# Patient Record
(demographics unavailable — no encounter records)

---

## 2024-11-04 NOTE — ASSESSMENT
[Levothyroxine] : The patient was instructed to take Levothyroxine on an empty stomach, separate from vitamins, and wait at least 30 minutes before eating [FreeTextEntry1] : 27 yo M with PMH of primary hypogonadism and papillary thyroid carcinoma  #Primary Hypogonadism:  -  testosterone cypionate 0.6 mL weekly, injects on mondays (due today) - trough -Testosterone replacement was intermittently stopped when the patient had an elbow fracture. Patient reinitiated therapy.   - check testosterone, CBC, CMP  -DEXA 4/2023: repeat in 2 years RESULTS: Spine: -0.6, normal. Femoral neck: 0.0 , normal. Total hip: 0.9 , normal.  #Papillary thyroid CA: s/p R lobectomy 8/6/18. Tumor size <1cm, w/no LN involvement. Stage [id number]. Thyroid US done 10/19 showed no evidence of recurrent disease. No nodules or suspicious LN.  - SOFY low risk, goal TSH 0.5-2 -Continue levothyroxine 112 mcg daily pending labs. +Tg given hx of only lobectomy - 10/2022: neck US wnl - repeat neck US ordered today.  #Hypothyroidism - continue LT4 112 mcg as above, check labs today  RTC 6 months

## 2024-11-04 NOTE — PHYSICAL EXAM
[Alert] : alert [Well Nourished] : well nourished [No Acute Distress] : no acute distress [No Neck Mass] : no neck mass was observed [Thyroid Not Enlarged] : the thyroid was not enlarged [No Respiratory Distress] : no respiratory distress [No Accessory Muscle Use] : no accessory muscle use [Clear to Auscultation] : lungs were clear to auscultation bilaterally [Normal PMI] : the apical impulse was normal [Normal S1, S2] : normal S1 and S2 [Normal Rate] : heart rate was normal [Oriented x3] : oriented to person, place, and time [Normal Affect] : the affect was normal [Normal Insight/Judgement] : insight and judgment were intact [Normal Mood] : the mood was normal

## 2024-11-04 NOTE — HISTORY OF PRESENT ILLNESS
[FreeTextEntry1] : 27 yo M with PMH of primary hypogonadism and papillary thyroid carcinoma.  He was diagnosed with ALL at 6 years old when he presented with pallor and dizziness. He was treated but had a relapse in 2005 for which he received more chemo plus radiation therapy. He completed his treatment in 2006. He 1st started tx for hypogonadism in December 2009 by luciana sharpe.   Bone Health: He had a bone density from 9/30/13 which was normal (lumbar spine z-score -1.1, right femoral neck -0.2, left femoral neck -0.1).  Repeat DXA in 3/2020 showing osteopenia at the spine. -DEXA 4/2023: RESULTS: Spine: -0.6, normal. Femoral neck: 0.0 , normal. Total hip: 0.9 , normal.  He was on testosterone Transdermal Gel (AndroGel Pump) 3 pumps daily but this was changed to testosterone cypionate injections in 2021. No complaints today. No erectile dysfunction concerns. Understands that he will not be able to have children on his own. Has seen urology previously.  current regimen: testosterone cypionate 0.6 mL weekly, injects on mondays (due today) - trough feels tired by time dose is due and maybe low mood   Acquired hypothyroidism:  Thyroid US in 2018 done which showed a 1.2cm thyroid nodule w/ irregular borders and area of microcalcification. No FH of thyroid CA. No RT to head/neck/chest area. No obstructive sx. FNA positive for PTC and he is s/p R thyroid lobectomy on 8/6/18 by Dr. Quevedo. 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 Margins involved by carcinoma. pT1aN0 5/2019 TSH 3.86 , FT4 1.3. 10/2019 thyroid US: no evidence of recurrent disease; no LAD. 10/2022 thyroid US: wnl No c/o constipation/ hair loss/ dry skin/ muscle aches. No cold intolerance.   Needs US   current regimen: levothyroxine 112 mcg daily   SH: works at  [employer]   ROS: having some fatigue towards the 10th day as the dose is wearing off, he states by day 12 he definitely feels tired.  Open to trying once weekly.   Diet is better now.  Exercises 5-6 times weekly. Now working on body building